# Patient Record
Sex: MALE | Race: BLACK OR AFRICAN AMERICAN | NOT HISPANIC OR LATINO | Employment: OTHER | URBAN - METROPOLITAN AREA
[De-identification: names, ages, dates, MRNs, and addresses within clinical notes are randomized per-mention and may not be internally consistent; named-entity substitution may affect disease eponyms.]

---

## 2019-06-06 PROBLEM — H57.10 BLIND PAINFUL EYE: Status: ACTIVE | Noted: 2019-06-06

## 2019-06-06 PROBLEM — H54.40 BLIND PAINFUL EYE: Status: ACTIVE | Noted: 2019-06-06

## 2021-01-20 PROBLEM — Z13.29 THYROID DISORDER SCREEN: Status: ACTIVE | Noted: 2021-01-20

## 2021-01-20 PROBLEM — E11.9 TYPE 2 DIABETES MELLITUS WITHOUT COMPLICATION, WITHOUT LONG-TERM CURRENT USE OF INSULIN: Status: ACTIVE | Noted: 2021-01-20

## 2021-02-18 ENCOUNTER — PATIENT OUTREACH (OUTPATIENT)
Dept: ADMINISTRATIVE | Facility: HOSPITAL | Age: 46
End: 2021-02-18

## 2021-04-26 PROBLEM — Z13.29 THYROID DISORDER SCREEN: Status: RESOLVED | Noted: 2021-01-20 | Resolved: 2021-04-26

## 2021-07-20 ENCOUNTER — PATIENT OUTREACH (OUTPATIENT)
Dept: ADMINISTRATIVE | Facility: HOSPITAL | Age: 46
End: 2021-07-20

## 2021-11-08 PROBLEM — R00.0 TACHYCARDIA: Status: ACTIVE | Noted: 2017-08-17

## 2021-11-08 PROBLEM — R89.9 ABNORMAL LABORATORY TEST RESULT: Status: ACTIVE | Noted: 2021-11-08

## 2021-11-08 PROBLEM — E11.3591 PROLIFERATIVE DIABETIC RETINOPATHY OF RIGHT EYE WITHOUT MACULAR EDEMA ASSOCIATED WITH TYPE 2 DIABETES MELLITUS: Status: ACTIVE | Noted: 2018-02-05

## 2021-11-08 PROBLEM — F16.10 PCP (PHENCYCLIDINE) ABUSE: Status: ACTIVE | Noted: 2017-08-17

## 2021-11-08 PROBLEM — E11.65 HYPERGLYCEMIC CRISIS IN DIABETES MELLITUS: Status: ACTIVE | Noted: 2018-03-11

## 2021-11-08 PROBLEM — I10 HYPERTENSION: Status: ACTIVE | Noted: 2021-11-08

## 2021-11-08 PROBLEM — N17.9 ACUTE KIDNEY INJURY: Status: ACTIVE | Noted: 2017-08-14

## 2021-11-08 PROBLEM — I48.0 PAROXYSMAL ATRIAL FIBRILLATION: Status: ACTIVE | Noted: 2017-08-17

## 2022-03-13 PROBLEM — Q11.1 ANOPHTHALMOS OF LEFT EYE: Status: ACTIVE | Noted: 2022-03-13

## 2022-11-29 LAB — CRC RECOMMENDATION EXT: NORMAL

## 2023-01-10 ENCOUNTER — PATIENT OUTREACH (OUTPATIENT)
Dept: ADMINISTRATIVE | Facility: HOSPITAL | Age: 48
End: 2023-01-10

## 2023-01-11 DIAGNOSIS — E11.9 TYPE 2 DIABETES MELLITUS WITHOUT COMPLICATION: ICD-10-CM

## 2023-01-11 NOTE — PROGRESS NOTES
Population Health Outreach.Records Received, hyper-linked into chart at this time. The following record(s)  below were uploaded for Health Maintenance .      COLONOSCOPY = 11.29.22

## 2023-02-09 ENCOUNTER — PATIENT OUTREACH (OUTPATIENT)
Dept: ADMINISTRATIVE | Facility: OTHER | Age: 48
End: 2023-02-09

## 2023-02-09 NOTE — PROGRESS NOTES
CHW - Initial Contact    This Community Health Worker completed the Social Determinant of Health questionnaire with patient during clinic visit today.    Pt identified barriers of most importance are: patient identified no barriers of importance during clinic visit   Referrals to community agencies completed with patient consent outside of [facility] include: No community referral needed during clinic visit  Referrals were put through [facility] - no:   Support and Services: No support services needed during clinic visit  Other information discussed the patient needs help with: patient discussed no other information during clinic visit   Follow up required: No   No future outreach task assigned

## 2023-02-10 PROBLEM — F19.10 POLYSUBSTANCE ABUSE: Status: ACTIVE | Noted: 2023-02-10

## 2023-05-18 ENCOUNTER — PATIENT OUTREACH (OUTPATIENT)
Dept: ADMINISTRATIVE | Facility: OTHER | Age: 48
End: 2023-05-18

## 2023-05-18 NOTE — PROGRESS NOTES
CHW - Follow Up    This Community Health Worker completed a follow up visit with patient during clinic visit today.  Pt reported: patient reported he is doing good no assistance is needed during clinic visit .   Will reach out if assistance is needed in the future.   Community Health Worker provided: CHW spoke with patient he is doing good.   Follow up required: No  No future outreach task assigned     CHW - Case Closure    This Community Health Worker spoke to patient during clinic visit today.   Pt reported: patient reported he is doing good no assistance is needed during clinic visit .   Will reach out if assistance is needed in the future.   Pt denied any additional needs at this time and agrees with episode closure at this time.  Provided patient with Community Health Worker's contact information and encouraged him to contact this Community Health Worker if additional needs arise.

## 2024-07-23 ENCOUNTER — PATIENT OUTREACH (OUTPATIENT)
Dept: ADMINISTRATIVE | Facility: OTHER | Age: 49
End: 2024-07-23

## 2024-07-23 NOTE — PROGRESS NOTES
CHW - Initial Contact    This Community Health Worker completed the Social Determinant of Health questionnaire with patient during clinic visit today.    Pt identified barriers of most importance are: patient identified no barriers of importance during clinic visit   Referrals to community agencies completed with patient consent outside of [facility] include: No community referral needed during clinic visit   Referrals were put through [facility] - no:   Support and Services: No support services needed  during clinic visit   Other information discussed the patient needs help with: patient discussed no other information during clinic visit   Follow up required: yes  Follow-up Outreach - Due: 8/13/2024

## 2024-09-25 ENCOUNTER — PATIENT OUTREACH (OUTPATIENT)
Dept: ADMINISTRATIVE | Facility: OTHER | Age: 49
End: 2024-09-25

## 2024-09-25 NOTE — PROGRESS NOTES
CHW - Follow Up    This Community Health Worker completed a follow up visit with patient via telephone today.  Pt reported: patient reported he is doing fine no assistance is needed at this time during follow up   Phone interview. Patient will reach out if assistance is needed in the future.   Community Health Worker provided: patient is doing well   Follow up required: No  No future outreach task assigned                   CHW - Case Closure    This Community Health Worker spoke to patient via telephone today.   Pt reported: patient reported he is doing fine no assistance is needed at this time during follow up   Phone interview. Patient will reach out if assistance is needed in the future.   Pt denied any additional needs at this time and agrees with episode closure at this time.    Provided patient with Community Health Worker's contact information and encouraged   him to contact this Community Health Worker if additional needs arise.

## 2025-05-19 PROBLEM — Z12.5 PROSTATE CANCER SCREENING: Status: ACTIVE | Noted: 2025-05-19

## 2025-06-25 ENCOUNTER — PATIENT OUTREACH (OUTPATIENT)
Dept: ADMINISTRATIVE | Facility: OTHER | Age: 50
End: 2025-06-25

## 2025-06-25 NOTE — PROGRESS NOTES
CHW - Initial Contact    This Community Health Worker completed the Social Determinant of Health questionnaire with patient during clinic visit today.    Pt identified barriers of most importance are: patient identified no barriers of importance during clinic visit    Referrals to community agencies completed with patient consent outside of [facility] include: No community referral needed  During clinic visit  Referrals were put through [facility] - no:   Support and Services: No support services needed during clinic visit   Other information discussed the patient needs help with: patient discussed no other information during clinic visit    Follow up required: yes  Follow-up Outreach - Due: 7/9/2025

## 2025-08-18 ENCOUNTER — PATIENT OUTREACH (OUTPATIENT)
Dept: ADMINISTRATIVE | Facility: OTHER | Age: 50
End: 2025-08-18
Payer: MEDICAID